# Patient Record
Sex: OTHER/UNKNOWN | URBAN - METROPOLITAN AREA
[De-identification: names, ages, dates, MRNs, and addresses within clinical notes are randomized per-mention and may not be internally consistent; named-entity substitution may affect disease eponyms.]

---

## 2018-02-20 ENCOUNTER — EMERGENCY (EMERGENCY)
Facility: HOSPITAL | Age: 28
LOS: 1 days | Discharge: HOME | End: 2018-02-20
Admitting: EMERGENCY MEDICINE
Payer: COMMERCIAL

## 2018-02-20 VITALS
HEART RATE: 90 BPM | TEMPERATURE: 98 F | OXYGEN SATURATION: 100 % | RESPIRATION RATE: 16 BRPM | SYSTOLIC BLOOD PRESSURE: 122 MMHG | DIASTOLIC BLOOD PRESSURE: 87 MMHG

## 2018-02-20 DIAGNOSIS — R68.84 JAW PAIN: Chronic | ICD-10-CM

## 2018-02-20 PROCEDURE — 99283 EMERGENCY DEPT VISIT LOW MDM: CPT

## 2018-02-20 RX ORDER — OXYCODONE HYDROCHLORIDE 5 MG/1
1 TABLET ORAL
Qty: 12 | Refills: 0 | OUTPATIENT
Start: 2018-02-20

## 2018-02-20 NOTE — ED PROVIDER NOTE - MOUTH
no intraoral or mandibular swelling, no induration, no fluctuance, mild ttp along edges of mandible b/l, no bleeding, no purulent drainage, airway patent, able to swallow and speak w/o difficulty
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
